# Patient Record
Sex: FEMALE | Race: BLACK OR AFRICAN AMERICAN | NOT HISPANIC OR LATINO | URBAN - NONMETROPOLITAN AREA
[De-identification: names, ages, dates, MRNs, and addresses within clinical notes are randomized per-mention and may not be internally consistent; named-entity substitution may affect disease eponyms.]

---

## 2021-04-01 NOTE — PATIENT DISCUSSION
Continue: Systane Gel (artificial tears(hypromellose)): gel: 0.3% 1 a small amount at bedtime into both eyes

## 2021-05-21 NOTE — PATIENT DISCUSSION
GLAUCOMA SUSPECT OU: HVF TODAY WITHIN NORMAL LIMITS OU. NO TREATMENT INDICATED AT THIS TIME. FOLLOW-UP AS DIRECTED. MONITOR.

## 2021-07-30 NOTE — PATIENT DISCUSSION
PLAQUENIL USE WITHOUT TOXICITY OU: HVF 10-2 WITHIN NORMAL LIMITS TODAY OU. OK TO CONTINUE MEDICATION AS PRESCRIBED. MONITOR.

## 2021-10-06 NOTE — PATIENT DISCUSSION
Continue: Systane Gel (artificial tears(hypromellose)): gel: 0.3% 1 a small amount at bedtime into both eyes.

## 2021-10-06 NOTE — PATIENT DISCUSSION
No evidence of macular toxicity on exam & testing today. Educated patient on findings & low but possible risk of future toxicity with patient. Educated that risk is cumulative with time & dosage.

## 2022-05-11 ENCOUNTER — NEW PATIENT (OUTPATIENT)
Dept: URBAN - NONMETROPOLITAN AREA CLINIC 6 | Facility: CLINIC | Age: 67
End: 2022-05-11

## 2022-05-11 DIAGNOSIS — H43.813: ICD-10-CM

## 2022-05-11 DIAGNOSIS — H25.13: ICD-10-CM

## 2022-05-11 DIAGNOSIS — H04.123: ICD-10-CM

## 2022-05-11 PROCEDURE — 92015 DETERMINE REFRACTIVE STATE: CPT

## 2022-05-11 PROCEDURE — 92004 COMPRE OPH EXAM NEW PT 1/>: CPT

## 2022-05-11 ASSESSMENT — VISUAL ACUITY
OD_SC: 20/60-1
OU_SC: 20/60-1
OS_SC: 20/70

## 2022-05-11 ASSESSMENT — KERATOMETRY
OS_AXISANGLE_DEGREES: 180
OD_K1POWER_DIOPTERS: 41.00
OD_K2POWER_DIOPTERS: 41.25
OS_AXISANGLE2_DEGREES: 90
OD_AXISANGLE_DEGREES: 3
OS_K1POWER_DIOPTERS: 41.00
OD_AXISANGLE2_DEGREES: 93
OS_K2POWER_DIOPTERS: 41.50

## 2022-05-11 ASSESSMENT — TONOMETRY
OS_IOP_MMHG: 17
OD_IOP_MMHG: 18

## 2022-05-11 NOTE — PATIENT DISCUSSION
Discussed potential cataract surgery in the near future; patient ed not to fill glasses Rx if decides to have surgery as Rx will change.

## 2025-07-11 ENCOUNTER — NEW PATIENT (OUTPATIENT)
Age: 70
End: 2025-07-11

## 2025-07-11 DIAGNOSIS — H52.4: ICD-10-CM

## 2025-07-11 DIAGNOSIS — H25.13: ICD-10-CM

## 2025-07-11 DIAGNOSIS — H04.123: ICD-10-CM

## 2025-07-11 DIAGNOSIS — H43.813: ICD-10-CM

## 2025-07-11 PROCEDURE — 92004 COMPRE OPH EXAM NEW PT 1/>: CPT

## 2025-07-11 PROCEDURE — 92015 DETERMINE REFRACTIVE STATE: CPT
